# Patient Record
Sex: MALE | Race: OTHER | HISPANIC OR LATINO
[De-identification: names, ages, dates, MRNs, and addresses within clinical notes are randomized per-mention and may not be internally consistent; named-entity substitution may affect disease eponyms.]

---

## 2023-04-28 ENCOUNTER — NON-APPOINTMENT (OUTPATIENT)
Age: 53
End: 2023-04-28

## 2023-05-09 PROBLEM — Z00.00 ENCOUNTER FOR PREVENTIVE HEALTH EXAMINATION: Status: ACTIVE | Noted: 2023-05-09

## 2023-05-22 ENCOUNTER — APPOINTMENT (OUTPATIENT)
Dept: SURGERY | Facility: CLINIC | Age: 53
End: 2023-05-22
Payer: COMMERCIAL

## 2023-05-22 ENCOUNTER — NON-APPOINTMENT (OUTPATIENT)
Age: 53
End: 2023-05-22

## 2023-05-22 VITALS
DIASTOLIC BLOOD PRESSURE: 82 MMHG | WEIGHT: 207 LBS | BODY MASS INDEX: 28.98 KG/M2 | HEIGHT: 71 IN | HEART RATE: 84 BPM | SYSTOLIC BLOOD PRESSURE: 124 MMHG | TEMPERATURE: 96.8 F | OXYGEN SATURATION: 97 %

## 2023-05-22 PROCEDURE — 21930 EXC BACK LES SC < 3 CM: CPT

## 2023-05-22 NOTE — PROCEDURE
[FreeTextEntry1] : This patient has a subcutaneous mass which she has had for couple of years.  He reports that it is recently grown a bit and is causing him some symptoms when he sits back.  He denies any inflammation or tenderness in the area.  He has no other medical issues and is not on any medications.  After informed consent was obtained the area was prepped and draped in the usual fashion.  Local anesthesia administered with 1% Xylocaine .  A 2 cm incision was made over the palpable mass and extended down into the subcutaneous fat.  A well-circumscribed fatty nodule was identified and removed in 1 piece. This measured about 2.5 by 2 cm  At this point the wound was irrigated and closed in 2 layers with 3-0 Vicryl and 4-0 Monocryl.  There was good hemostasis.  Dermabond applied as a dressing.  The patient tolerated procedure well.\par

## 2023-05-22 NOTE — ASSESSMENT
[FreeTextEntry1] : Lipoma of back excised under local anesthesia in the office.  The patient tolerated seizure well.  He will return in about 10 days for wound check.  The specimen was sent for pathology.

## 2023-05-23 ENCOUNTER — LABORATORY RESULT (OUTPATIENT)
Age: 53
End: 2023-05-23

## 2023-06-05 ENCOUNTER — APPOINTMENT (OUTPATIENT)
Dept: SURGERY | Facility: CLINIC | Age: 53
End: 2023-06-05
Payer: COMMERCIAL

## 2023-06-05 VITALS
HEIGHT: 71 IN | OXYGEN SATURATION: 98 % | DIASTOLIC BLOOD PRESSURE: 72 MMHG | TEMPERATURE: 97.3 F | SYSTOLIC BLOOD PRESSURE: 120 MMHG | WEIGHT: 207 LBS | HEART RATE: 84 BPM | BODY MASS INDEX: 28.98 KG/M2

## 2023-06-05 PROCEDURE — 99024 POSTOP FOLLOW-UP VISIT: CPT

## 2023-06-06 NOTE — HISTORY OF PRESENT ILLNESS
[de-identified] : Patient recently presented with symptomatic subcutaneous mass mid back. Excised in office. 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 No complaints.